# Patient Record
Sex: FEMALE | Race: WHITE | Employment: UNEMPLOYED | URBAN - METROPOLITAN AREA
[De-identification: names, ages, dates, MRNs, and addresses within clinical notes are randomized per-mention and may not be internally consistent; named-entity substitution may affect disease eponyms.]

---

## 2024-08-09 ENCOUNTER — OFFICE VISIT (OUTPATIENT)
Dept: FAMILY MEDICINE CLINIC | Age: 34
End: 2024-08-09

## 2024-08-09 VITALS
TEMPERATURE: 98.4 F | BODY MASS INDEX: 24.8 KG/M2 | HEIGHT: 63 IN | DIASTOLIC BLOOD PRESSURE: 70 MMHG | SYSTOLIC BLOOD PRESSURE: 110 MMHG | OXYGEN SATURATION: 98 % | WEIGHT: 140 LBS | HEART RATE: 84 BPM

## 2024-08-09 DIAGNOSIS — Z20.822 EXPOSURE TO CONFIRMED CASE OF COVID-19: ICD-10-CM

## 2024-08-09 DIAGNOSIS — J06.9 VIRAL URI: Primary | ICD-10-CM

## 2024-08-09 LAB
Lab: NORMAL
PERFORMING INSTRUMENT: NORMAL
QC PASS/FAIL: NORMAL
SARS-COV-2, POC: NORMAL

## 2024-08-09 PROCEDURE — 87426 SARSCOV CORONAVIRUS AG IA: CPT

## 2024-08-09 PROCEDURE — 99213 OFFICE O/P EST LOW 20 MIN: CPT

## 2024-08-09 RX ORDER — DEXAMETHASONE 6 MG/1
6 TABLET ORAL DAILY
Qty: 7 TABLET | Refills: 0 | Status: SHIPPED | OUTPATIENT
Start: 2024-08-09 | End: 2024-08-16

## 2024-08-09 NOTE — PROGRESS NOTES
Chief Complaint       Nasal Congestion (Started this morning, all sypmtoms) and Headache (Whole head feels heaviness.)    History of Present Illness   Source of history provided by:  patient.      Zeus Salazar is a 33 y.o. old female presenting to the walk in clinic for evaluation of nasal congestion, nasal drainage, temporal headache which all started this morning. Has been taking nothing OTC without relief. Denies any fever, chills, wheezing, CP, SOB, or GI symptoms.  Denies any hx of asthma, COPD, or tobacco use.  Pt house was currently infected with COVID-19.    ROS    Unless otherwise stated in this report or unable to obtain because of the patient's clinical or mental status as evidenced by the medical record, this patients's positive and negative responses for Review of Systems, constitutional, psych, eyes, ENT, cardiovascular, respiratory, gastrointestinal, neurological, genitourinary, musculoskeletal, integument systems and systems related to the presenting problem are either stated in the preceding or were not pertinent or were negative for the symptoms and/or complaints related to the medical problem.      Physical Exam         VS:  /70   Pulse 84   Temp 98.4 °F (36.9 °C) (Temporal)   Ht 1.6 m (5' 3\")   Wt 63.5 kg (140 lb)   LMP 07/18/2024 (Exact Date)   SpO2 98%   BMI 24.80 kg/m²    Oxygen Saturation Interpretation: Normal.    Constitutional:  Alert, development consistent with age.  Ears:  External Ears: Bilateral pinna normal. TMs translucent without erythema or perforation bilaterally.  Canals normal bilaterally without swelling or exudate  Nose: Mild congestion of the nasal mucosa. There is injection to middle turbinates bilaterally.   Throat: Mild posterior pharyngeal erythema with mild post nasal drip present.  No exudate or tonsillar hypertrophy noted.    Neck:  Supple. There is no anterior cervical adenopathy.  Lungs: CTAB without wheezes, rales, or rhonchi  Heart:  Regular rate